# Patient Record
Sex: MALE | Race: WHITE
[De-identification: names, ages, dates, MRNs, and addresses within clinical notes are randomized per-mention and may not be internally consistent; named-entity substitution may affect disease eponyms.]

---

## 2017-10-11 ENCOUNTER — HOSPITAL ENCOUNTER (OUTPATIENT)
Dept: HOSPITAL 61 - PCVCIMAG | Age: 57
Discharge: HOME | End: 2017-10-11
Attending: INTERNAL MEDICINE
Payer: COMMERCIAL

## 2017-10-11 DIAGNOSIS — I08.1: Primary | ICD-10-CM

## 2017-10-11 DIAGNOSIS — I10: ICD-10-CM

## 2017-10-11 DIAGNOSIS — E78.5: ICD-10-CM

## 2017-10-11 DIAGNOSIS — I48.91: ICD-10-CM

## 2017-10-11 PROCEDURE — 93306 TTE W/DOPPLER COMPLETE: CPT

## 2017-10-11 NOTE — PCVCIMAG
--------------- APPROVED REPORT --------------





Study performed:  10/11/2017 13:24:49



EXAM: Comprehensive 2D, Doppler, and color-flow 

Echocardiogram

Patient Location: Echo lab

Status:  routine



BSA:         2.20

HR: 100 bpmBP:          140/84 mmHg

Rhythm: Atrial Fibrillation



Other Information 

Study Quality: Adequate



Risk Factors: 

Cardiac Risk Factors:  HTN



Indications

Atrial Fibrillation



2D Dimensions

LVEF(%):  54.25 (&gt;50%)

IVSd:  10.19 (7-11mm)

LVDd:  50.98 mm

PWd:  11.03 (7-11mm)

LVDs:  36.59 (25-40mm)

Left Atrium:  45.85 (27-40mm)

Aortic Root:  31.21 mm

LV Single Plane 4CH:  44.71 %

LV Single Plane 2CH:  42.51 %[facility] LVEF:  43.61 %

Biplane EF:  43.5 %



Volumes

Left Atrial Volume (Systole)

Single Plane 4CH:  95.97 mLSingle Plane 2CH:  75.42 mL

LA ESV Index:  40.00 mL/m2



Aortic Valve

AoV Peak Drake.:  1.17 m/s

AO Peak Gr.:  5.60 mmHgLVOT Max P.75 mmHg

LVOT Max V:  0.82 m/s



Pulmonary Valve

PV Peak Drake.:  0.93 m/sPV Peak Gr.:  3.52 mmHg



Tricuspid Valve

TR Peak Drake.:  2.48 m/s

TR Peak Gr.:  24.74 mmHg



Left Ventricle

The left ventricle is normal size. There is normal LV segmental wall 

motion. There is normal left ventricular wall thickness. Left 

ventricular systolic function is mildly decreased with atrial 

fibrillation. LVEF is 45%. This study is not technically sufficient 

to allow evaluation of the LV diastolic function due to atrial 

fibrillation.



Right Ventricle

The right ventricle is normal size. The right ventricular systolic 

function is normal.



Atria

Left atrium is mild-moderately dilated. The right atrium size is 

normal.



Aortic Valve

The aortic valve is normal in structure. No aortic regurgitation is 

present. There is no aortic valvular stenosis.



Mitral Valve

The mitral valve is normal in structure. Trace mitral regurgitation. 

No evidence of mitral valve stenosis.



Tricuspid Valve

The tricuspid valve is normal in structure. Mild tricuspid 

regurgitation with PAP of 32 mmHg.



Pulmonic Valve

The pulmonary valve is normal in structure. There is no pulmonic 

valvular regurgitation.



Great Vessels

The aortic root is normal in size. IVC is normal in size and 

collapses with &gt;50% inspiration



Pericardium

There is no pericardial effusion.



&lt;Conclusion&gt;

The left ventricle is normal size.

Left ventricular systolic function is mildly decreased with atrial 

fibrillation.

LVEF is 45%.

This study is not technically sufficient to allow evaluation of the 

LV diastolic function due to atrial fibrillation.

The right ventricle is normal size.

Left atrium is mild-moderately dilated.

The right atrium size is normal.

There is no aortic valvular stenosis.

There is no aortic valvular stenosis.

Trace mitral regurgitation.

Mild tricuspid regurgitation with PAP of 32 mmHg.

There is no pericardial effusion.

## 2018-10-30 ENCOUNTER — HOSPITAL ENCOUNTER (OUTPATIENT)
Dept: HOSPITAL 61 - PCVCIMAG | Age: 58
Discharge: HOME | End: 2018-10-30
Attending: INTERNAL MEDICINE
Payer: COMMERCIAL

## 2018-10-30 DIAGNOSIS — I48.91: Primary | ICD-10-CM

## 2018-10-30 DIAGNOSIS — I42.9: ICD-10-CM

## 2018-10-30 DIAGNOSIS — I10: ICD-10-CM

## 2018-10-30 PROCEDURE — 93306 TTE W/DOPPLER COMPLETE: CPT

## 2018-10-30 NOTE — PCVCIMAG
--------------- APPROVED REPORT --------------





Study performed:  10/30/2018 13:21:17



EXAM: Comprehensive 2D, Doppler, and color-flow 

Echocardiogram

Patient Location: Echo lab

Room #:  2Status:  routine



BSA:         2.26

HR: 125 bpmBP:          138/82 mmHg

Rhythm: Atrial Fibrillation



Other Information 

Study Quality: Adequate



Risk Factors: 

Cardiac Risk Factors:  HTN, Hyperlipidemia



Indications

Atrial Fibrillation

Cardiomyopathy

Hypertension/HDD



2D Dimensions

IVSd:  9.65 (7-11mm)LVOT Diam:  19.69 (18-24mm) 

LVDd:  56.28 mm

PWd:  8.30 (7-11mm)Ascending Ao:  27.94 (22-36mm)

LVDs:  41.86 (25-40mm)

Left Atrium:  41.00 (27-40mm)

Aortic Root:  26.22 mm

LV Single Plane 4CH:  53.93 %

LV Single Plane 2CH:  45.64 %

Biplane EF:  54.1 %



Volumes

Left Atrial Volume (Systole)

Single Plane 4CH:  67.78 mLSingle Plane 2CH:  70.89 mL

Biplane LA Volume:  70.00 mLLA ESV Index:  31.00 mL/m2



Aortic Valve

AoV Peak Drake.:  1.21 m/s

AO Peak Gr.:  9.41 mmHgLVOT Max P.80 mmHg

LVOT Max V:  0.77 m/s

CICI Vmax: 1.93 cm2



Mitral Valve

MV E Max Drake.:  0.73 m/s

MV PHT:  36.15 ms

MVA (PHT):  6.09 cm2



TDI

E/Lateral E':  4.87E/Medial E':  7.30

Medial E' Drake.:  0.10 m/s

Lateral E' Drake.:  0.15 m/s



Pulmonary Valve

PV Peak Drake.:  1.08 m/sPV Peak Gr.:  4.71 mmHg



Tricuspid Valve

TR Peak Drake.:  2.35 m/s

TR Peak Gr.:  22.00 mmHg

TV Vmax:  0.58 m/sPA Pressure:  29.00 mmHg



Left Ventricle

The left ventricle is normal size. There is normal LV segmental wall 

motion. There is normal left ventricular wall thickness. Left 

ventricular systolic function is borderline. LVEF is 50-55%. This 

study is not technically sufficient to allow evaluation of the LV 

diastolic function due to atrial fibrillation.



Right Ventricle

The right ventricle is normal size. The right ventricular systolic 

function is normal.



Atria

The left atrium size is normal. The right atrium size is 

normal.



Aortic Valve

Aortic valve is probably trileaflet. Aortic valve is not well 

visualized. No aortic regurgitation is present. There is no aortic 

valvular stenosis.



Mitral Valve

Mitral valve leaflets appear mildly myxomatous. There is no mitral 

valve regurgitation noted. No evidence of mitral valve 

stenosis.



Tricuspid Valve

The tricuspid valve is normal in structure. Trace to mild tricuspid 

regurgitation with a PA pressure of 29 mmHg.



Pulmonic Valve

The pulmonary valve is normal in structure. There is no pulmonic 

valvular regurgitation.



Great Vessels

The aortic root is normal in size. The ascending aorta is normal in 

size. Aortic arch is not well visualized. IVC is normal in size and 

collapses &gt;50% with inspiration.



Pericardium

There is no pericardial effusion. There is no pleural 

effusion.



&lt;Conclusion&gt;

The left ventricle is normal size.

LVEF is 50-55%.

This study is not technically sufficient to allow evaluation of the 

LV diastolic function due to atrial fibrillation.

The right ventricle is normal size.

The left atrium size is normal.

Aortic valve is probably trileaflet.

Aortic valve is not well visualized.

Mitral valve leaflets appear mildly myxomatous.

There is no mitral valve regurgitation noted.

Trace to mild tricuspid regurgitation with a PA pressure of 29 

mmHg.

The aortic root is normal in size.

There is no pericardial effusion.

## 2020-11-03 ENCOUNTER — HOSPITAL ENCOUNTER (OUTPATIENT)
Dept: HOSPITAL 35 - SJCVCIMAG | Age: 60
End: 2020-11-03
Attending: INTERNAL MEDICINE
Payer: COMMERCIAL

## 2020-11-03 DIAGNOSIS — I42.9: ICD-10-CM

## 2020-11-03 DIAGNOSIS — I48.91: ICD-10-CM

## 2020-11-03 DIAGNOSIS — I11.9: Primary | ICD-10-CM

## 2020-11-24 ENCOUNTER — HOSPITAL ENCOUNTER (OUTPATIENT)
Dept: HOSPITAL 35 - RAD | Age: 60
End: 2020-11-24
Attending: INTERNAL MEDICINE
Payer: COMMERCIAL

## 2020-11-24 DIAGNOSIS — E78.00: ICD-10-CM

## 2020-11-24 DIAGNOSIS — I25.10: ICD-10-CM

## 2020-11-24 DIAGNOSIS — Z13.6: Primary | ICD-10-CM
